# Patient Record
(demographics unavailable — no encounter records)

---

## 2024-10-15 NOTE — HISTORY OF PRESENT ILLNESS
[FreeTextEntry1] : 43 y/o male here today for ED issue and nocturia. The patient states the urinary flow is normal. He reports having decreased sex drive and rarely morning erections. Nocturia X4 (for the last 3 years), hematuria, dysuria, flank pain, or any other urinary issues MEGHA  Denies eating spicy, citrus, chocolate. Adequately hydrating  Not diabetic  FHx of PCA: denies Tobacco use: denies Last PSA: N/A Last Creatinine:  N/A Last UCx: N/A Uro Meds: N/A  Kidney percussion test negative bilaterally, no pain reported on bi-manual palpation bilaterally, no pain on superficial and deep palpation on the iliac fossa bilaterally and on the ureteral points   10/15/2024 - PVR 0cc

## 2024-10-15 NOTE — HISTORY OF PRESENT ILLNESS
[FreeTextEntry1] : 41 y/o male here today for ED issue and nocturia. The patient states the urinary flow is normal. He reports having decreased sex drive and rarely morning erections. Nocturia X4 (for the last 3 years), hematuria, dysuria, flank pain, or any other urinary issues MEGHA  Denies eating spicy, citrus, chocolate. Adequately hydrating  Not diabetic  FHx of PCA: denies Tobacco use: denies Last PSA: N/A Last Creatinine:  N/A Last UCx: N/A Uro Meds: N/A  Kidney percussion test negative bilaterally, no pain reported on bi-manual palpation bilaterally, no pain on superficial and deep palpation on the iliac fossa bilaterally and on the ureteral points   10/15/2024 - PVR 0cc

## 2024-10-15 NOTE — ASSESSMENT
[FreeTextEntry1] : 43 y/o male here today for ED issue and nocturia. The patient states the urinary flow is normal. He reports having decreased sex drive and rarely morning erections. Nocturia X4 (for the last 3 years), hematuria, dysuria, flank pain, or any other urinary issues MEGHA  Denies eating spicy, citrus, chocolate. Adequately hydrating  Not diabetic  FHx of PCA: denies Tobacco use: denies Last PSA: N/A Last Creatinine:  N/A Last UCx: N/A Uro Meds: N/A  Kidney percussion test negative bilaterally, no pain reported on bi-manual palpation bilaterally, no pain on superficial and deep palpation on the iliac fossa bilaterally and on the ureteral points   10/15/2024 - PVR 0cc  Blood draw for PSA + renal panel Collecting urine for UA and Culture  Will F/U in case of positive results  In case negative, will perform UDS  Will perform blood draw for testosterone between 7 and 10 AM in external lab.